# Patient Record
Sex: MALE
[De-identification: names, ages, dates, MRNs, and addresses within clinical notes are randomized per-mention and may not be internally consistent; named-entity substitution may affect disease eponyms.]

---

## 2023-09-11 ENCOUNTER — NURSE TRIAGE (OUTPATIENT)
Dept: OTHER | Facility: CLINIC | Age: 23
End: 2023-09-11

## 2023-09-11 NOTE — TELEPHONE ENCOUNTER
Location of patient: [place]    Subjective: Caller states lower abdominal pain    Current Symptoms: Lower mid abdominal pain and vomiting. Also having intermittent cramping in his hands. Denies dysuria, bloody or black stools. States slight distention to abdomen. Pt also requesting information for PCP's covered on insurance. Provided information to patient. Onset: 1 week ago; unchanged    Associated Symptoms:  hand cramping, sweating at times, chills at times    Pain Severity: 7/10; dull, aching; intermittent    Temperature: hasn't checked     What has been tried: nothing    Recommended disposition: Go to ED/[facility] Now (Or to Office with PCP Approval)    Care advice provided, patient verbalizes understanding; denies any other questions or concerns; instructed to call back for any new or worsening symptoms. Pt unestablished with a PCP. Triaged up to go to [facility] or ED for evaluation. This triage is a result of a call to [address] [place] [address]. Please do not respond to the triage nurse through this encounter. Any subsequent communication should be directly with the patient.   Reason for Disposition   MILD TO MODERATE constant pain lasting > 2 hours    Protocols used: Abdominal Pain - Male-ADULT-[place]